# Patient Record
Sex: FEMALE
[De-identification: names, ages, dates, MRNs, and addresses within clinical notes are randomized per-mention and may not be internally consistent; named-entity substitution may affect disease eponyms.]

---

## 2017-09-27 ENCOUNTER — TRANSCRIPTION ENCOUNTER (OUTPATIENT)
Age: 32
End: 2017-09-27

## 2018-02-11 ENCOUNTER — TRANSCRIPTION ENCOUNTER (OUTPATIENT)
Age: 33
End: 2018-02-11

## 2018-07-20 ENCOUNTER — TRANSCRIPTION ENCOUNTER (OUTPATIENT)
Age: 33
End: 2018-07-20

## 2019-03-26 ENCOUNTER — TRANSCRIPTION ENCOUNTER (OUTPATIENT)
Age: 34
End: 2019-03-26

## 2024-08-16 ENCOUNTER — APPOINTMENT (OUTPATIENT)
Dept: MRI IMAGING | Facility: CLINIC | Age: 39
End: 2024-08-16

## 2024-08-16 ENCOUNTER — APPOINTMENT (OUTPATIENT)
Dept: ORTHOPEDIC SURGERY | Facility: CLINIC | Age: 39
End: 2024-08-16
Payer: COMMERCIAL

## 2024-08-16 VITALS — HEIGHT: 69 IN | WEIGHT: 270 LBS | BODY MASS INDEX: 39.99 KG/M2

## 2024-08-16 DIAGNOSIS — Z78.9 OTHER SPECIFIED HEALTH STATUS: ICD-10-CM

## 2024-08-16 DIAGNOSIS — S83.412A SPRAIN OF MEDIAL COLLATERAL LIGAMENT OF LEFT KNEE, INITIAL ENCOUNTER: ICD-10-CM

## 2024-08-16 PROBLEM — Z00.00 ENCOUNTER FOR PREVENTIVE HEALTH EXAMINATION: Status: ACTIVE | Noted: 2024-08-16

## 2024-08-16 PROCEDURE — 99203 OFFICE O/P NEW LOW 30 MIN: CPT

## 2024-08-16 PROCEDURE — 73721 MRI JNT OF LWR EXTRE W/O DYE: CPT | Mod: LT

## 2024-08-16 NOTE — PHYSICAL EXAM
[5___] : quadriceps 5[unfilled]/5 [Positive] : positive Kenny [] : mildly antalgic [Left] : left knee [All Views] : anteroposterior, lateral, skyline, and anteroposterior standing [There are no fractures, subluxations or dislocations. No significant abnormalities are seen] : There are no fractures, subluxations or dislocations. No significant abnormalities are seen [FreeTextEntry9] : osteochondroma prox fibula [TWNoteComboBox7] : flexion 115 degrees

## 2024-08-16 NOTE — DISCUSSION/SUMMARY
[de-identified] : modify activities use elastic sleeve for structural support try OTC meds ice as needed try topical lidocaine for pain control reviewed current medications used by this patient home exercises for functional return offered asp she declined  RE:  CINDYANGELINA LUGO   [id number]   Attention:  Nurse Reviewer /Medical Director    Based on my patient's condition, I strongly believe that the MRI L knee is medically.necessary.   The patient has failed oral meds, injections and PT and conservative treatment in combination or by themselves and therefore needs the MRI.   The MRI will dictate further treatment t recommendations.

## 2024-08-16 NOTE — HISTORY OF PRESENT ILLNESS
[Sudden] : sudden [2] : 2 [Localized] : localized [Tightness] : tightness [Ice] : ice [Walking] : walking [Full time] : Work status: full time [de-identified] : happened last night was jumping off apparatus in playground with her child and twisted her knee, and has soem swelling, was given MDP and xrays, has pain [] : no [FreeTextEntry1] : l knee [FreeTextEntry3] : 8-15-24 [FreeTextEntry5] : pt playing  on playground with her child, waspulled and twisted leg [de-identified] : 8-15-24 [de-identified] : [employer] [de-identified] : xr [facility]

## 2024-08-23 ENCOUNTER — APPOINTMENT (OUTPATIENT)
Dept: ORTHOPEDIC SURGERY | Facility: CLINIC | Age: 39
End: 2024-08-23
Payer: COMMERCIAL

## 2024-08-23 VITALS — BODY MASS INDEX: 39.99 KG/M2 | HEIGHT: 69 IN | WEIGHT: 270 LBS

## 2024-08-23 DIAGNOSIS — S83.512D SPRAIN OF ANTERIOR CRUCIATE LIGAMENT OF LEFT KNEE, SUBSEQUENT ENCOUNTER: ICD-10-CM

## 2024-08-23 PROCEDURE — 99213 OFFICE O/P EST LOW 20 MIN: CPT

## 2024-08-23 NOTE — HISTORY OF PRESENT ILLNESS
[Radiating] : radiating [Sharp] : sharp [Leisure] : leisure [Social interactions] : social interactions [Ice] : ice [de-identified] : happened after umping off apparatus in playground with her child and twisted her knee, and has some swelling, had MRI, no buckling [Sudden] : sudden [2] : 2 [Localized] : localized [Tightness] : tightness [Walking] : walking [Full time] : Work status: full time [] : no [FreeTextEntry1] : l knee [FreeTextEntry3] : 8-15-24 [FreeTextEntry5] : pt playing  on playground with her child, waspulled and twisted leg [FreeTextEntry6] : pulling [FreeTextEntry7] : calf [de-identified] : bending [de-identified] : 8-15-24 [de-identified] : [employer] [de-identified] : xr [facility]

## 2024-08-23 NOTE — PHYSICAL EXAM
[Left] : left knee [5___] : quadriceps 5[unfilled]/5 [Positive] : positive Kenny [] : mildly antalgic [FreeTextEntry8] : sl [TWNoteComboBox7] : flexion 115 degrees

## 2024-08-23 NOTE — DISCUSSION/SUMMARY
[de-identified] : Patient allowed to gently start resuming activities. Discussed change to medication prescription and usage. Bracing options discussed with patient for stability and support. Activity modification as needed Discussed poss future surgery, pt deciding, questions answered, no guarantees, ACL reconst in future if instability and dysfuntion try topical lidocaine for pain control reviewed current medications used by this patient Home exercises for functional return 08/23/2024   RE:  CINDY PARISH   [id number]   Attention:  Nurse Reviewer /Medical Director  I am writing this letter as a medical necessity for a brace.acl function [Patient has tried analgesics, non-steroid anti-inflammatory agents,  physical therapy, hot or cold compresses,injections of corticosteroids, etc)  which in combination or by themselves has not worked. The brace is needed for joint stability and to improve function. Based on my patient's condition, I strongly believe that the brace is necessary.    Thank you for your time and consideration.    08/23/2024    RE:  CINDY PARISH   Acct #- [id number]    Attention:  Nurse Reviewer /Medical Director  I am writing this letter as a medical necessity for PT program. Patient has tried analgesics, non-steroid anti-inflammatory agents,  hot or cold compresses,injections of corticosteroids, etc)  which in combination or by themselves has not worked. Based on my patient's condition, I strongly believe that the PT is medically needed.   Thank you for your time and consideration.

## 2024-09-06 ENCOUNTER — APPOINTMENT (OUTPATIENT)
Dept: ORTHOPEDIC SURGERY | Facility: CLINIC | Age: 39
End: 2024-09-06
Payer: COMMERCIAL

## 2024-09-06 VITALS — WEIGHT: 270 LBS | BODY MASS INDEX: 39.99 KG/M2 | HEIGHT: 69 IN

## 2024-09-06 DIAGNOSIS — S83.512D SPRAIN OF ANTERIOR CRUCIATE LIGAMENT OF LEFT KNEE, SUBSEQUENT ENCOUNTER: ICD-10-CM

## 2024-09-06 PROCEDURE — 99213 OFFICE O/P EST LOW 20 MIN: CPT

## 2024-09-06 NOTE — DISCUSSION/SUMMARY
[de-identified] : Patient allowed to gently start resuming activities. Discussed change to medication prescription and usage. Bracing options discussed with patient for stability and support. Activity modification as needed Discussed poss future surgery, pt deciding, questions answered, no guarantees, ACL reconst in future if instability and dysfuntion try topical lidocaine for pain control reviewed current medications used by this patient Home exercises for functional return she inquired about GIANCARLO ACL 09/6/2024   RE[facility]  CINDY PARISH   [id number]   Attention:  Nurse Reviewer /Medical Director  I am writing this letter as a medical necessity for a brace.acl function [Patient has tried analgesics, non-steroid anti-inflammatory agents,  physical therapy, hot or cold compresses,injections of corticosteroids, etc)  which in combination or by themselves has not worked. The brace is needed for joint stability and to improve function. Based on my patient's condition, I strongly believe that the brace is necessary.    Thank you for your time and consideration.    08/23/2024    RE:  CINDY PARISH   [id number]    Attention:  Nurse Reviewer /Medical Director  I am writing this letter as a medical necessity for PT program. Patient has tried analgesics, non-steroid anti-inflammatory agents,  hot or cold compresses,injections of corticosteroids, etc)  which in combination or by themselves has not worked. Based on my patient's condition, I strongly believe that the PT is medically needed.   Thank you for your time and consideration.

## 2024-09-06 NOTE — REVIEW OF SYSTEMS
[Joint Pain] : joint pain [Joint Stiffness] : joint stiffness [Negative] : Heme/Lymph [Joint Swelling] : joint swelling [FreeTextEntry9] : in the AM

## 2024-09-06 NOTE — HISTORY OF PRESENT ILLNESS
[Localized] : localized [Rest] : rest [Full time] : Work status: full time [de-identified] : happened after jumping off apparatus in playground with her child and twisted her knee, and has known acl tear and certain motions make her feel worse [Sudden] : sudden [2] : 2 [Radiating] : radiating [Sharp] : sharp [Tightness] : tightness [Leisure] : leisure [Social interactions] : social interactions [Ice] : ice [Walking] : walking [] : no [FreeTextEntry1] : l knee [FreeTextEntry3] : 8-15-24 [FreeTextEntry5] : pt playing  on playground with her child, waspulled and twisted leg [FreeTextEntry6] : pulling [FreeTextEntry7] : calf [de-identified] : bending [de-identified] : 8-15-24 [de-identified] : [employer] [de-identified] : xr [facility] [de-identified] : start PT on Monday

## 2024-10-18 ENCOUNTER — APPOINTMENT (OUTPATIENT)
Dept: ORTHOPEDIC SURGERY | Facility: CLINIC | Age: 39
End: 2024-10-18